# Patient Record
Sex: FEMALE | Race: WHITE | NOT HISPANIC OR LATINO | Employment: STUDENT | URBAN - METROPOLITAN AREA
[De-identification: names, ages, dates, MRNs, and addresses within clinical notes are randomized per-mention and may not be internally consistent; named-entity substitution may affect disease eponyms.]

---

## 2023-06-29 PROBLEM — J01.90 ACUTE BACTERIAL SINUSITIS: Status: ACTIVE | Noted: 2023-06-29

## 2023-06-29 PROBLEM — N39.0 UTI (URINARY TRACT INFECTION): Status: ACTIVE | Noted: 2023-06-29

## 2023-06-29 PROBLEM — R00.2 PALPITATIONS: Status: ACTIVE | Noted: 2023-06-29

## 2023-06-29 PROBLEM — B96.89 ACUTE BACTERIAL SINUSITIS: Status: ACTIVE | Noted: 2023-06-29

## 2023-06-29 PROBLEM — F41.9 ANXIETY: Status: ACTIVE | Noted: 2023-06-29

## 2023-06-29 PROBLEM — R30.0 DYSURIA: Status: ACTIVE | Noted: 2023-06-29

## 2023-06-29 PROBLEM — F32.A DEPRESSION: Status: ACTIVE | Noted: 2023-06-29

## 2023-06-29 PROBLEM — R01.1 HEART MURMUR: Status: ACTIVE | Noted: 2023-06-29

## 2023-06-29 PROBLEM — L73.9 FOLLICULITIS: Status: ACTIVE | Noted: 2023-06-29

## 2024-07-01 PROCEDURE — 88305 TISSUE EXAM BY PATHOLOGIST: CPT | Performed by: DERMATOLOGY

## 2024-07-01 PROCEDURE — 88312 SPECIAL STAINS GROUP 1: CPT | Performed by: DERMATOLOGY

## 2024-07-09 ENCOUNTER — LAB REQUISITION (OUTPATIENT)
Dept: DERMATOPATHOLOGY | Facility: CLINIC | Age: 24
End: 2024-07-09
Payer: COMMERCIAL

## 2024-07-09 DIAGNOSIS — L98.8 OTHER SPECIFIED DISORDERS OF THE SKIN AND SUBCUTANEOUS TISSUE: ICD-10-CM

## 2024-07-11 LAB
LABORATORY COMMENT REPORT: NORMAL
PATH REPORT.FINAL DX SPEC: NORMAL
PATH REPORT.GROSS SPEC: NORMAL
PATH REPORT.MICROSCOPIC SPEC OTHER STN: NORMAL
PATH REPORT.RELEVANT HX SPEC: NORMAL
PATH REPORT.TOTAL CANCER: NORMAL

## 2024-08-01 NOTE — PROGRESS NOTES
"Subjective   Patient ID: Danielle Thompson is a 23 y.o. female who presents for Annual Exam (Discuss Zoloft medication and anxiety ).    HPI     Anxiety: She states that she feels like she needs more. She admits to not taking it regularly. She feels good when she takes it regularly. She is doing online therapy every week and every other week and this is helping. She was struggling over the beginning of summer.   Needing to work on routine, does well with sertraline when she takes it regularly        Palpitation, has hx of benign heart murmurs. 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 She wants to do genetic counseling. She is also interested in psych.   Going back 8/18.      All other systems reviewed and negative for complaint unless stated above.     Surgery: none   Family: DM, htn, heart disease with grandparents     Review of Systems   Constitutional:  Negative for chills and fever.   HENT:  Negative for congestion, ear pain and rhinorrhea.    Eyes:  Negative for discharge and redness.   Respiratory:  Negative for cough, shortness of breath and wheezing.    Cardiovascular:  Negative for chest pain and leg swelling.   Gastrointestinal:  Negative for abdominal pain, constipation, diarrhea, nausea and vomiting.   Genitourinary:  Negative for difficulty urinating, frequency and urgency.   Musculoskeletal:  Negative for gait problem.   Skin:  Negative for rash and wound.   Neurological:  Negative for dizziness, weakness and headaches.   Psychiatric/Behavioral:  Negative for confusion. The patient is not nervous/anxious.        Objective   /73 (BP Location: Right arm, Patient Position: Sitting, BP Cuff Size: Adult)   Pulse 73   Ht 1.626 m (5' 4\")   Wt 63.4 kg (139 lb 12.8 oz)   BMI 24.00 kg/m²     Physical Exam  Vitals reviewed.   Constitutional:       Appearance: Normal appearance.   HENT:      Head: Normocephalic.      Right Ear: " Tympanic membrane, ear canal and external ear normal.      Left Ear: Tympanic membrane, ear canal and external ear normal.      Nose: No rhinorrhea.      Mouth/Throat:      Mouth: Mucous membranes are moist.      Pharynx: Oropharynx is clear.   Eyes:      Pupils: Pupils are equal, round, and reactive to light.   Cardiovascular:      Rate and Rhythm: Normal rate and regular rhythm.      Pulses: Normal pulses.   Pulmonary:      Effort: Pulmonary effort is normal.      Breath sounds: Normal breath sounds.   Abdominal:      General: Abdomen is flat. Bowel sounds are normal.      Palpations: Abdomen is soft.   Musculoskeletal:         General: No tenderness. Normal range of motion.      Right lower leg: No edema.      Left lower leg: No edema.   Lymphadenopathy:      Cervical: No cervical adenopathy.   Skin:     General: Skin is warm and dry.      Findings: No rash.   Neurological:      Mental Status: She is alert and oriented to person, place, and time.   Psychiatric:         Mood and Affect: Mood normal.         Behavior: Behavior normal.         Assessment/Plan       #Anxiety  Continue sertraline at 25mg   goes to school in [place]   Discussed getting back into routine   Discussed possible increase if she takes it regularly   And it is not helping   She is talking with therapy now   Writing a paper and presenting in [place]      #Heart murmur  #Palpitations  happens on occasion   call if this worsens   can do new cardiac workup with any issues   Cut back on caffeine     #HCM  Routine labs   UTD on vaccines   Got flu and covid this year

## 2024-08-05 ENCOUNTER — APPOINTMENT (OUTPATIENT)
Dept: PRIMARY CARE | Facility: CLINIC | Age: 24
End: 2024-08-05
Payer: COMMERCIAL

## 2024-08-05 VITALS
DIASTOLIC BLOOD PRESSURE: 73 MMHG | BODY MASS INDEX: 23.87 KG/M2 | WEIGHT: 139.8 LBS | SYSTOLIC BLOOD PRESSURE: 117 MMHG | HEART RATE: 73 BPM | HEIGHT: 64 IN

## 2024-08-05 DIAGNOSIS — F41.9 ANXIETY: Primary | ICD-10-CM

## 2024-08-05 DIAGNOSIS — F32.A DEPRESSION, UNSPECIFIED DEPRESSION TYPE: ICD-10-CM

## 2024-08-05 PROCEDURE — 1036F TOBACCO NON-USER: CPT | Performed by: REGISTERED NURSE

## 2024-08-05 PROCEDURE — 99395 PREV VISIT EST AGE 18-39: CPT | Performed by: REGISTERED NURSE

## 2024-08-05 PROCEDURE — 3008F BODY MASS INDEX DOCD: CPT | Performed by: REGISTERED NURSE

## 2024-08-05 RX ORDER — SERTRALINE HYDROCHLORIDE 25 MG/1
25 TABLET, FILM COATED ORAL DAILY
Qty: 90 TABLET | Refills: 0 | Status: SHIPPED | OUTPATIENT
Start: 2024-08-05

## 2024-08-05 ASSESSMENT — ENCOUNTER SYMPTOMS
EYE DISCHARGE: 0
COUGH: 0
FREQUENCY: 0
CONSTIPATION: 0
SHORTNESS OF BREATH: 0
RHINORRHEA: 0
NAUSEA: 0
ABDOMINAL PAIN: 0
VOMITING: 0
EYE REDNESS: 0
CHILLS: 0
WOUND: 0
DIARRHEA: 0
DIZZINESS: 0
WEAKNESS: 0
NERVOUS/ANXIOUS: 0
CONFUSION: 0
HEADACHES: 0
FEVER: 0
WHEEZING: 0
DIFFICULTY URINATING: 0

## 2024-09-04 NOTE — PROGRESS NOTES
Subjective   Patient ID: Danielle Thompson is a 23 y.o. female who presents for No chief complaint on file..    HPI     Anxiety: She states that she feels like she needs more. She admits to not taking it regularly. She feels good when she takes it regularly. She is doing online therapy every week and every other week and this is helping. She was struggling over the beginning of summer.   Needing to work on routine, does well with sertraline when she takes it regularly.   She has a pill case. She has a visual case to remember. Doing much better. General stress from school. Does not think she needs to increase at this time.      Palpitation, has hx of benign heart murmurs. Stress and anxiety related, cut back on caffeine.      Going to [employer], molecular genetics, taking organic chemistry. She wants to do genetic counseling. She is also interested in psych.   Going back 8/18.      All other systems reviewed and negative for complaint unless stated above.     Surgery: none   Family: DM, htn, heart disease with grandparents     Review of Systems    Objective   There were no vitals taken for this visit.    Physical Exam  Phone visit     Assessment/Plan       #Anxiety  Continue sertraline at 25mg   goes to school in [place]   Discussed getting back into routine   Discussed possible increase if she takes it regularly   And it is not helping   She is talking with therapy now   Writing a paper and presenting in [place]      #Heart murmur  #Palpitations  happens on occasion   call if this worsens   can do new cardiac workup with any issues   Cut back on caffeine     #HCM  Routine labs   UTD on vaccines   Got flu and covid this year

## 2024-09-09 ENCOUNTER — APPOINTMENT (OUTPATIENT)
Dept: PRIMARY CARE | Facility: CLINIC | Age: 24
End: 2024-09-09
Payer: COMMERCIAL

## 2024-09-09 DIAGNOSIS — F41.9 ANXIETY: Primary | ICD-10-CM

## 2024-09-09 PROCEDURE — 99442 PR PHYS/QHP TELEPHONE EVALUATION 11-20 MIN: CPT | Performed by: REGISTERED NURSE

## 2024-11-06 ENCOUNTER — TELEPHONE (OUTPATIENT)
Dept: PRIMARY CARE | Facility: CLINIC | Age: 24
End: 2024-11-06
Payer: COMMERCIAL

## 2024-11-06 DIAGNOSIS — N39.0 URINARY TRACT INFECTION WITHOUT HEMATURIA, SITE UNSPECIFIED: Primary | ICD-10-CM

## 2024-11-06 RX ORDER — CIPROFLOXACIN 500 MG/1
500 TABLET ORAL 2 TIMES DAILY
Qty: 10 TABLET | Refills: 0 | Status: SHIPPED | OUTPATIENT
Start: 2024-11-06 | End: 2024-11-11